# Patient Record
(demographics unavailable — no encounter records)

---

## 2024-11-25 NOTE — HISTORY OF PRESENT ILLNESS
[FreeTextEntry1] : 82 year old female here for follow up of thyroid function and osteoporosis. Hx of Afib, HTN, HLD.  She has history of cardiac ablation for recurrent palpitations (2017). She was started on amiodarone 200 mg after inability to do the ablation (history of Isai). She has come off of amiodarone in 2/2021, taken off because of the amiodarone was causing neuropathic symptoms. She has been better and denied any recent tachycardia. Now on sotalol 40mg BID. Also taking Diltiazem 180mg daily and valsartan-hctz 80-12.5 daily. States she had low blood pressure x 1 yesterday. Did not take Diltiazem or Valsartan-hctz today.  #Hypothyroidism: She has had subclinical hypothyroidism for many years She is now on levothyroxine 75 mcg daily, taking correctly - Denies increased fatigue - Denies significant changes in weight - Denies hair loss - Denies heart palpitations - Baseline essential tremor - Denies cold/heat intolerance - Reports normal bowel movements  #Osteoporosis hx: Diagnosed 2019 Prior treatment: started in 2/22 Hx of height loss? No Current smoker? No Previous fracture? No FHx of hip fracture in parent? No Glucocorticoid use? No Rheumatoid arthritis? No Alcohol intake: Minimal Exercise: minimal   Vitamin D 1000 units daily, does not take calcium (does have green veges) Hormone-replacement therapy: She was on it for 10 years LMP: age 55-56 years Hx of kidney stones? None No celiacs disease, but dose have lactose intolerance  Bridge replacement - ongoing dental review   BMD  - BMD 2/23: L-spine: -1.6, Fem Neck: -2.7, Distal 1/3rd: -1.2 - 2/2024: BMD as follows: L-spine: -1.4 (significant increase), Fem neck: -2.5 (no significant change), Total hip: -1.9 (previously -1.9, clinically unsignificant change) and Distal 1/3rd: -1.1 (no significant change)  #Hx of Afib - Cardiologist at [facility]  - several heart ablations  - Taking Sotalol 40mg BID. Also taking Diltiazem 180mg daily and valsartan-hctz 80-12.5 daily.  #HLD - -Rosuvastatin 20mg daily  -Pt to follow up with cardiologist

## 2024-11-25 NOTE — PHYSICAL EXAM
[de-identified] : General: Patient appears well nourished without any distress  HEENT: Thyroid is supple, no nodules palpated, no LAD  Neuro: No focal deficits noted. No tremors GI: No pain with palpation

## 2024-11-25 NOTE — ASSESSMENT
[FreeTextEntry1] : 81 year old female here for f/u of osteoporosis and hypothyroidism  #Osteoporosis - BMD 2/23: L-spine: -1.6, Fem Neck: -2.7, Distal 1/3rd: -1.2 - 2/2024: BMD as follows: L-spine: -1.4, Fem neck: -2.5, Total hip: -1.9 (previously -1.9, clinically unsignificant change) and Distal 1/3rd: -1.1 - Stable DEXA scan, continue Alendronate 70 mg weekly - Repeat DEXA scan in 1 year (Feb 2025) - Labs today - calcium, vit D   #Hypothyroidism - 7/2024 TFT wnl, recheck today - Continue levothyroxine 75 mcg  #Pre diabetes  -HbA1c 6.3% -Lifestyle modifications  -Recheck today  Follow up in 6 months.

## 2025-06-25 NOTE — HISTORY OF PRESENT ILLNESS
[FreeTextEntry1] : HTN, URI [de-identified] : exercise- walking 2/ wk reviewed 11/25/24 labs A1c 5.8, LDL 90, tri 217 pt c/o freq urination, dysuria- reviewed 6/1/25 UC notes- tx w Cefdinir- took it for 4 days. pt said sympt improved but didn't  resolve.  pt went to  6/18/25- tx w Nitrofurantoin- 6/18/25- took it for 4 days but today had urgency and dysuria again- so restarted it reviewed 6/1/25. 6/18/25 UCx- susp to all abt. reviewed 10/11/23 B surg  - nodular breast.  reviewed 10/8/24 Mammo/ US- stable mass IGT- diet reviewed c/o 1 mo of numbness L thumb- a few wks ago- dev L hand numbness- intermittent- occasionally during the day Milvia paresthesia- F/u by Dr. SANDRA Martinez. signif improved.  partially due to peripheral neuropathy, 85 %better now. thinks it was due to amiodarone- stopped it 1-2 yr ago.  numbness in the toe-  reviewed 2/15/24 cologuard- neg.  reviewed 2/28/24 DEXA- osteopenia- reviewed 2/28/24 Endo notes - Parkinsons Afib- had ablation - then recurrence- had ablation w Dr. Olmedo at [facility] - diltiazem and xeralto was chg to ASA 81mg.  Since lowering diltiazem, pt has not had palpitation.  pt's Cardiologist , Dr. Anne.  pt is getting a 2nd opinion Dr. Thomson at [facility].  .  has new LOOP rec replacement.  over past 3 wk- no Palpations Hypothyroid, osteoporosis- reviewed 8/16/23 Endo notes-   Dr. Merlos has moved so pt 's is chg Endo  reviewed 2/15/23 DEXA- Osteoporosis.  reviewed 3/31/25 DEXA- osteoporosis HTN, lipid-compliant w med rarely takes xanax- every 10 days  c/o arthritis - knees- b/l - chronic knee pain.  no trauma- got gel , cortisone shot .

## 2025-06-25 NOTE — ASSESSMENT
[FreeTextEntry1] : UTI- complete full course of nitrofurontin- rx it again.  hydrate.  probiotic- take mid day non fasting- Blood was collected in the office.